# Patient Record
Sex: MALE | Race: BLACK OR AFRICAN AMERICAN | NOT HISPANIC OR LATINO | Employment: UNEMPLOYED | URBAN - METROPOLITAN AREA
[De-identification: names, ages, dates, MRNs, and addresses within clinical notes are randomized per-mention and may not be internally consistent; named-entity substitution may affect disease eponyms.]

---

## 2020-07-21 PROCEDURE — U0003 INFECTIOUS AGENT DETECTION BY NUCLEIC ACID (DNA OR RNA); SEVERE ACUTE RESPIRATORY SYNDROME CORONAVIRUS 2 (SARS-COV-2) (CORONAVIRUS DISEASE [COVID-19]), AMPLIFIED PROBE TECHNIQUE, MAKING USE OF HIGH THROUGHPUT TECHNOLOGIES AS DESCRIBED BY CMS-2020-01-R: HCPCS | Performed by: PHYSICIAN ASSISTANT

## 2020-07-23 ENCOUNTER — TELEPHONE (OUTPATIENT)
Dept: URGENT CARE | Facility: CLINIC | Age: 25
End: 2020-07-23

## 2020-07-23 NOTE — TELEPHONE ENCOUNTER
Patient lab result reviewed with RASHIDA BAPTISTE. Patient notified of positive Covid-19 test result. VU. States he no longer has symptoms. Advised to remain in quarantine for 14 days, per CDC recommendation, LASHONDA.  Advised health department would be notified and following-up with him, LASHONDA. PUI form, reportable disease form and lab result faxed to Dept. of Health at [phone].

## 2023-05-15 ENCOUNTER — OFFICE VISIT (OUTPATIENT)
Dept: FAMILY MEDICINE CLINIC | Facility: CLINIC | Age: 28
End: 2023-05-15

## 2023-05-15 VITALS
WEIGHT: 140 LBS | HEIGHT: 74 IN | BODY MASS INDEX: 17.97 KG/M2 | HEART RATE: 61 BPM | OXYGEN SATURATION: 99 % | TEMPERATURE: 98 F | SYSTOLIC BLOOD PRESSURE: 88 MMHG | DIASTOLIC BLOOD PRESSURE: 59 MMHG

## 2023-05-15 DIAGNOSIS — F41.9 ANXIETY: ICD-10-CM

## 2023-05-15 DIAGNOSIS — F90.9 ATTENTION DEFICIT HYPERACTIVITY DISORDER (ADHD), UNSPECIFIED ADHD TYPE: ICD-10-CM

## 2023-05-15 DIAGNOSIS — G47.00 INSOMNIA, UNSPECIFIED TYPE: ICD-10-CM

## 2023-05-15 DIAGNOSIS — M25.562 ACUTE PAIN OF LEFT KNEE: ICD-10-CM

## 2023-05-15 DIAGNOSIS — F20.0 SCHIZOPHRENIA, PARANOID, CHRONIC: Primary | ICD-10-CM

## 2023-05-15 DIAGNOSIS — F33.1 MODERATE EPISODE OF RECURRENT MAJOR DEPRESSIVE DISORDER: ICD-10-CM

## 2023-05-15 DIAGNOSIS — M54.50 CHRONIC BILATERAL LOW BACK PAIN WITHOUT SCIATICA: ICD-10-CM

## 2023-05-15 DIAGNOSIS — G89.29 CHRONIC BILATERAL LOW BACK PAIN WITHOUT SCIATICA: ICD-10-CM

## 2023-05-15 PROBLEM — F32.A DEPRESSION: Status: ACTIVE | Noted: 2023-05-15

## 2023-05-15 RX ORDER — TRAZODONE HYDROCHLORIDE 50 MG/1
TABLET ORAL
Qty: 60 TABLET | Refills: 0 | Status: SHIPPED | OUTPATIENT
Start: 2023-05-15

## 2023-05-15 RX ORDER — ARIPIPRAZOLE 5 MG/1
5 TABLET ORAL DAILY
Qty: 30 TABLET | Refills: 0 | Status: SHIPPED | OUTPATIENT
Start: 2023-05-15

## 2023-05-15 RX ORDER — DICLOFENAC SODIUM 75 MG/1
75 TABLET, DELAYED RELEASE ORAL 2 TIMES DAILY
Qty: 60 TABLET | Refills: 2 | Status: SHIPPED | OUTPATIENT
Start: 2023-05-15

## 2023-05-15 NOTE — PROGRESS NOTES
New Patient History and Physical      Referring Physician: No ref. provider found    Subjective     Chief Complaint:    Chief Complaint   Patient presents with   • Anxiety   • Depression   • Suicidal     Pt sts he finds ways to keep himself occupied and distracted,  has tried 12/ 2022 to kill himself, told friend what he had done and they took him to ER   • Schizophrenia     Pt sts he has olga on abilify, depakote, adderall and trazadone,  been off since November 2022   • Back Pain     Pt sts has seen chiropractor, also having knee pain       History of Present Illness:   Here as a new patient, from [place] but living [place], has been in [place] since Feb.   Has schizoephrenia. His best combo of meds was abilify, adderall, trazodone and depakote. He stopped due to lack of provider. He treid to commit suiicde in decemeber   He will wake up several times throughout the night (7-9) times. Will also fall asleep during the day. He will fall asleep driving so he does not drive anymore. He at times have blurry vision and then wake up. Happens mid conversation, while playing.   No current SI/HI  Has chronic back pain, has curvature of his lower back, feels like constant pressure, used to see chiro and that really helpd his pain. He works as a  building stages.   Does not take anything for his pain, at times will wake him up  Some tingling in lumbar but no radation into his legs  No bladder or bowel dysfunction   Left knee pain x several weeks, happen after he jumped up on deck and hit his knee, worse now with bending and when his knee touches anything, was swollen but not too bad now.             Review of Systems     Past Medical History:   Past Medical History:   Diagnosis Date   • ADHD (attention deficit hyperactivity disorder)    • Anxiety    • Depression    • Schizophrenia, paranoid, chronic     Pt sts he hears and sees things       Past Surgical History:  Past Surgical History:   Procedure Laterality  "Date   • COLONOSCOPY         Family History: family history is not on file.    Social History:  reports that he has never smoked. He has never been exposed to tobacco smoke. He has never used smokeless tobacco. He reports that he does not drink alcohol and does not use drugs.    Medications:    Current Outpatient Medications:   •  Multiple Vitamins-Minerals (MULTIVITAMIN GUMMIES ADULT PO), Take  by mouth., Disp: , Rfl:   •  ARIPiprazole (Abilify) 5 MG tablet, Take 1 tablet by mouth Daily., Disp: 30 tablet, Rfl: 0  •  diclofenac (VOLTAREN) 75 MG EC tablet, Take 1 tablet by mouth 2 (Two) Times a Day., Disp: 60 tablet, Rfl: 2  •  traZODone (DESYREL) 50 MG tablet, 1-2 po hs prn sleep, Disp: 60 tablet, Rfl: 0    Allergies:  No Known Allergies    Objective     Vital Signs:   Vitals:    05/15/23 1140   BP: (!) 88/59   Pulse: 61   Temp: 98 °F (36.7 °C)   SpO2: 99%   Weight: 63.5 kg (140 lb)   Height: 188 cm (74\")   PainSc:   5     Body mass index is 17.97 kg/m².    Physical Exam:    Physical Exam  Constitutional:       Appearance: Normal appearance. He is well-developed.   HENT:      Head: Normocephalic and atraumatic.      Right Ear: Tympanic membrane, ear canal and external ear normal.      Left Ear: Tympanic membrane, ear canal and external ear normal.      Nose: Nose normal.      Mouth/Throat:      Pharynx: Uvula midline.   Eyes:      Pupils: Pupils are equal, round, and reactive to light.   Cardiovascular:      Rate and Rhythm: Normal rate and regular rhythm.      Heart sounds: Normal heart sounds. No murmur heard.    No friction rub. No gallop.   Pulmonary:      Effort: Pulmonary effort is normal.      Breath sounds: Normal breath sounds.   Abdominal:      General: Bowel sounds are normal.      Palpations: Abdomen is soft.      Tenderness: There is no abdominal tenderness.   Musculoskeletal:      Cervical back: Neck supple.   Lymphadenopathy:      Head:      Right side of head: No submental, submandibular, tonsillar, " preauricular or posterior auricular adenopathy.      Left side of head: No submental, submandibular, tonsillar, preauricular or posterior auricular adenopathy.      Cervical: No cervical adenopathy.   Skin:     General: Skin is warm and dry.   Neurological:      General: No focal deficit present.      Mental Status: He is alert and oriented to person, place, and time.   Psychiatric:         Mood and Affect: Mood normal.         Behavior: Behavior normal.         Assessment / Plan     Assessment/Plan:   Problem List Items Addressed This Visit        Mental Health    Schizophrenia, paranoid, chronic - Primary    Overview     Pt sts he hears and sees things         Relevant Medications    traZODone (DESYREL) 50 MG tablet    ARIPiprazole (Abilify) 5 MG tablet    Other Relevant Orders    Ambulatory Referral to Behavioral Health    Depression    Relevant Medications    traZODone (DESYREL) 50 MG tablet    ARIPiprazole (Abilify) 5 MG tablet    Other Relevant Orders    Ambulatory Referral to Behavioral Health    Anxiety    Relevant Orders    Ambulatory Referral to Behavioral Health    ADHD (attention deficit hyperactivity disorder)    Relevant Medications    traZODone (DESYREL) 50 MG tablet    ARIPiprazole (Abilify) 5 MG tablet    Other Relevant Orders    Ambulatory Referral to Behavioral Health   Other Visit Diagnoses     Insomnia, unspecified type        Relevant Medications    traZODone (DESYREL) 50 MG tablet    Chronic bilateral low back pain without sciatica        Relevant Medications    diclofenac (VOLTAREN) 75 MG EC tablet    Other Relevant Orders    Ambulatory Referral to Chiropractic    Acute pain of left knee        Relevant Medications    diclofenac (VOLTAREN) 75 MG EC tablet    Other Relevant Orders    Ambulatory Referral to Chiropractic        --We will get him back in with behavioral health  --Restart Abilify and trazodone.  I will see him back in 2 weeks to see how he is doing and plan to uptitrate his  medicines at that time  --Other orders as above    Discussed plan of care in detail with pt today; pt verb understanding and agrees.    I have reviewed pertinent health maintenance applicable to this patient.    Follow up:  2 to 3 weeks    Electronically signed by OLIVA aVsquez   05/15/2023 11:45 EDT      Please note that portions of this note were completed with a voice recognition program.

## 2023-05-31 PROBLEM — G47.00 INSOMNIA: Status: ACTIVE | Noted: 2023-05-31

## 2023-11-08 ENCOUNTER — OFFICE VISIT (OUTPATIENT)
Dept: FAMILY MEDICINE CLINIC | Facility: CLINIC | Age: 28
End: 2023-11-08
Payer: MEDICAID

## 2023-11-08 VITALS
OXYGEN SATURATION: 98 % | DIASTOLIC BLOOD PRESSURE: 70 MMHG | WEIGHT: 169 LBS | BODY MASS INDEX: 21.69 KG/M2 | SYSTOLIC BLOOD PRESSURE: 110 MMHG | HEIGHT: 74 IN | HEART RATE: 70 BPM

## 2023-11-08 DIAGNOSIS — Z11.3 ROUTINE SCREENING FOR STI (SEXUALLY TRANSMITTED INFECTION): ICD-10-CM

## 2023-11-08 DIAGNOSIS — F33.1 MODERATE EPISODE OF RECURRENT MAJOR DEPRESSIVE DISORDER: ICD-10-CM

## 2023-11-08 DIAGNOSIS — F20.0 SCHIZOPHRENIA, PARANOID, CHRONIC: Primary | ICD-10-CM

## 2023-11-08 DIAGNOSIS — G47.00 INSOMNIA, UNSPECIFIED TYPE: ICD-10-CM

## 2023-11-08 PROCEDURE — 1159F MED LIST DOCD IN RCRD: CPT | Performed by: NURSE PRACTITIONER

## 2023-11-08 PROCEDURE — 1160F RVW MEDS BY RX/DR IN RCRD: CPT | Performed by: NURSE PRACTITIONER

## 2023-11-08 PROCEDURE — 99214 OFFICE O/P EST MOD 30 MIN: CPT | Performed by: NURSE PRACTITIONER

## 2023-11-08 RX ORDER — TRAZODONE HYDROCHLORIDE 50 MG/1
TABLET ORAL
Qty: 180 TABLET | Refills: 0 | Status: SHIPPED | OUTPATIENT
Start: 2023-11-08

## 2023-11-08 RX ORDER — ARIPIPRAZOLE 5 MG/1
5 TABLET ORAL DAILY
Qty: 90 TABLET | Refills: 0 | Status: SHIPPED | OUTPATIENT
Start: 2023-11-08

## 2023-11-08 NOTE — PROGRESS NOTES
"      Subjective     Chief Complaint:    Chief Complaint   Patient presents with    Sexual Problem     Wants to be checked for STD's and refill on antipsychotic drugs       History of Present Illness:     Marco Ricci is a 27 year old male that presents to clinic today for STI screening. Denies urinary difficulties/symptoms. Sexually active, past two months 1 partner female   Denies condom use   Denies burning with urination or penile discharge     Denies tobacco use ( stopped in 2015)  Stopped vaping 2 weeks ago  Recreational Mariajuana use     Still c/o sleep difficulties, on trazodone - sleeps throughout the day. No longer driving due to being fearful of falling asleep @ the wheel. Denies history of seizures. Mood disorder, on Abilify, Needs referral to behavorial health.       Review of Systems  Gen- No fevers, chills  CV- No chest pain, palpitations  Resp- No cough, dyspnea  GI- No N/V/D, abd pain  Neuro-No dizziness, headaches      I have reviewed and/or updated the patient's past medical, surgical, family, social history and problem list as appropriate.     Medications:    Current Outpatient Medications:     ARIPiprazole (Abilify) 5 MG tablet, Take 1 tablet by mouth Daily., Disp: 90 tablet, Rfl: 0    Multiple Vitamins-Minerals (MULTIVITAMIN GUMMIES ADULT PO), Take  by mouth., Disp: , Rfl:     traZODone (DESYREL) 50 MG tablet, 1-2 po hs prn sleep, Disp: 180 tablet, Rfl: 0    Allergies:  No Known Allergies    Objective     Vital Signs:   Vitals:    11/08/23 1440   BP: 110/70   Pulse: 70   SpO2: 98%   Weight: 76.7 kg (169 lb)   Height: 188 cm (74\")     Body mass index is 21.7 kg/m².    Physical Exam:    Physical Exam  Constitutional:       Appearance: Normal appearance.   HENT:      Head: Normocephalic and atraumatic.   Cardiovascular:      Rate and Rhythm: Normal rate and regular rhythm.   Pulmonary:      Effort: Pulmonary effort is normal.      Breath sounds: Normal breath sounds.   Abdominal:      General: " Abdomen is flat.      Palpations: Abdomen is soft.   Skin:     General: Skin is warm and dry.   Neurological:      General: No focal deficit present.      Mental Status: He is alert and oriented to person, place, and time.   Psychiatric:         Mood and Affect: Mood normal.         Behavior: Behavior normal.         Assessment / Plan     Assessment/Plan:   Problem List Items Addressed This Visit       Schizophrenia, paranoid, chronic - Primary    Overview     Pt sts he hears and sees things         Relevant Medications    traZODone (DESYREL) 50 MG tablet    ARIPiprazole (Abilify) 5 MG tablet    Other Relevant Orders    Ambulatory Referral to Behavioral Health    Depression    Relevant Medications    traZODone (DESYREL) 50 MG tablet    ARIPiprazole (Abilify) 5 MG tablet    Other Relevant Orders    Ambulatory Referral to Behavioral Health    Insomnia    Relevant Medications    traZODone (DESYREL) 50 MG tablet    Other Relevant Orders    Ambulatory Referral to Behavioral Health     Other Visit Diagnoses       Routine screening for STI (sexually transmitted infection)        Relevant Orders    Chlamydia trachomatis, Neisseria gonorrhoeae, PCR - , Urine, Clean Catch    Trichomonas vaginalis, PCR - Urine, Urine, Clean Catch    HIV-1/O/2 Ag/Ab w Reflex    Hepatitis C Antibody    RPR    HSV 1 & 2 - Specific Antibody, IgG          -- meds refilled  -- get in with behavior health   -- labs as above     Discussed plan of care in detail with pt today; pt verb understanding and agrees.    Follow up:  As needed    Electronically signed by OLIVA Vasquez   11/08/2023 14:46 EST      Please note that portions of this note were completed with a voice recognition program.

## 2023-11-09 LAB
HCV IGG SERPL QL IA: NON REACTIVE
HIV 1+2 AB+HIV1 P24 AG SERPL QL IA: NON REACTIVE
HSV1 IGG SER IA-ACNC: <0.91 INDEX (ref 0–0.9)
HSV2 IGG SER IA-ACNC: 2.96 INDEX (ref 0–0.9)
RPR SER QL: NON REACTIVE

## 2023-11-10 LAB
C TRACH RRNA SPEC QL NAA+PROBE: NEGATIVE
N GONORRHOEA RRNA SPEC QL NAA+PROBE: NEGATIVE
T VAGINALIS RRNA SPEC QL NAA+PROBE: NEGATIVE